# Patient Record
Sex: MALE
[De-identification: names, ages, dates, MRNs, and addresses within clinical notes are randomized per-mention and may not be internally consistent; named-entity substitution may affect disease eponyms.]

---

## 2019-02-12 ENCOUNTER — HOSPITAL ENCOUNTER (OUTPATIENT)
Dept: HOSPITAL 88 - RAD | Age: 69
End: 2019-02-12
Attending: FAMILY MEDICINE
Payer: MEDICARE

## 2019-02-12 DIAGNOSIS — Z01.810: Primary | ICD-10-CM

## 2019-02-12 DIAGNOSIS — Z01.811: ICD-10-CM

## 2019-02-12 PROCEDURE — 93005 ELECTROCARDIOGRAM TRACING: CPT

## 2019-02-12 PROCEDURE — 71046 X-RAY EXAM CHEST 2 VIEWS: CPT

## 2019-02-12 NOTE — DIAGNOSTIC IMAGING REPORT
EXAMINATION: PA and lateral views of the chest.



COMPARISON: None



CLINICAL HISTORY:  Preoperative study

     

DISCUSSION:



Lungs are well-inflated. No focal airspace consolidation, pleural effusion, or

pneumothorax. Multiple median sternotomy wires and mediastinal surgical clips.

Normal heart size. No pulmonary edema.



No acute osseous abnormality.



IMPRESSION: 

No acute cardiopulmonary abnormalities.











Signed by: Dr. Christiano Armstrong M.D. on 2/12/2019 3:38 PM

## 2019-02-18 ENCOUNTER — HOSPITAL ENCOUNTER (OUTPATIENT)
Dept: HOSPITAL 88 - WCC | Age: 69
LOS: 10 days | End: 2019-02-28
Attending: FAMILY MEDICINE
Payer: MEDICARE

## 2019-02-18 DIAGNOSIS — Z01.810: ICD-10-CM

## 2019-02-18 DIAGNOSIS — L97.519: ICD-10-CM

## 2019-02-18 DIAGNOSIS — Z01.811: ICD-10-CM

## 2019-02-18 DIAGNOSIS — E11.621: Primary | ICD-10-CM

## 2019-02-18 DIAGNOSIS — E03.9: ICD-10-CM

## 2019-02-18 DIAGNOSIS — I70.203: ICD-10-CM

## 2019-02-18 DIAGNOSIS — M96.89: ICD-10-CM

## 2019-02-18 DIAGNOSIS — I10: ICD-10-CM

## 2019-02-18 DIAGNOSIS — I25.118: ICD-10-CM

## 2019-02-18 DIAGNOSIS — E78.00: ICD-10-CM

## 2019-02-18 DIAGNOSIS — I97.89: ICD-10-CM

## 2019-02-18 DIAGNOSIS — L60.0: ICD-10-CM

## 2019-02-18 PROCEDURE — 82948 REAGENT STRIP/BLOOD GLUCOSE: CPT

## 2019-02-18 PROCEDURE — 36415 COLL VENOUS BLD VENIPUNCTURE: CPT
